# Patient Record
Sex: FEMALE | Race: ASIAN | NOT HISPANIC OR LATINO | Employment: UNEMPLOYED | URBAN - METROPOLITAN AREA
[De-identification: names, ages, dates, MRNs, and addresses within clinical notes are randomized per-mention and may not be internally consistent; named-entity substitution may affect disease eponyms.]

---

## 2022-05-28 LAB
ABO GROUP BLD: NORMAL
HBV SURFACE AG SERPL QL IA: NEGATIVE
HIV 1+2 AB+HIV1 P24 AG SERPL QL IA: NON REACTIVE
RH BLD: POSITIVE
RUBV IGG SERPL IA-ACNC: 2.39
TREPONEMA PALLIDUM IGG+IGM AB [PRESENCE] IN SERUM OR PLASMA BY IMMUNOASSAY: NON REACTIVE

## 2022-08-22 ENCOUNTER — TELEPHONE (OUTPATIENT)
Dept: SCHEDULING | Facility: IMAGING CENTER | Age: 37
End: 2022-08-22

## 2022-08-30 LAB
GLUCOSE 1H P 50 G GLC PO SERPL-MCNC: 217 MG/DL
GLUCOSE 2H P CHAL SERPL-MCNC: 146 MG/DL
GLUCOSE 3H P CHAL SERPL-MCNC: 120 MG/DL
GLUCOSE TOL INTERP 786: 84

## 2022-09-15 ENCOUNTER — OFFICE VISIT (OUTPATIENT)
Dept: MEDICAL GROUP | Facility: LAB | Age: 37
End: 2022-09-15
Payer: COMMERCIAL

## 2022-09-15 VITALS
RESPIRATION RATE: 15 BRPM | TEMPERATURE: 97.5 F | HEART RATE: 93 BPM | OXYGEN SATURATION: 96 % | WEIGHT: 163.5 LBS | DIASTOLIC BLOOD PRESSURE: 58 MMHG | HEIGHT: 65 IN | BODY MASS INDEX: 27.24 KG/M2 | SYSTOLIC BLOOD PRESSURE: 102 MMHG

## 2022-09-15 DIAGNOSIS — Z23 NEED FOR VACCINATION: ICD-10-CM

## 2022-09-15 DIAGNOSIS — Z76.89 ENCOUNTER TO ESTABLISH CARE: ICD-10-CM

## 2022-09-15 DIAGNOSIS — Z34.90 PREGNANCY, UNSPECIFIED GESTATIONAL AGE: ICD-10-CM

## 2022-09-15 PROCEDURE — 99203 OFFICE O/P NEW LOW 30 MIN: CPT | Mod: 25 | Performed by: FAMILY MEDICINE

## 2022-09-15 PROCEDURE — 90471 IMMUNIZATION ADMIN: CPT | Performed by: FAMILY MEDICINE

## 2022-09-15 PROCEDURE — 90715 TDAP VACCINE 7 YRS/> IM: CPT | Performed by: FAMILY MEDICINE

## 2022-09-15 ASSESSMENT — ENCOUNTER SYMPTOMS
NAUSEA: 0
CHILLS: 0
WHEEZING: 0
CONSTIPATION: 0
SHORTNESS OF BREATH: 0
ABDOMINAL PAIN: 0
FEVER: 0
DIARRHEA: 0
VOMITING: 0
PALPITATIONS: 0

## 2022-09-15 NOTE — PROGRESS NOTES
"Lyndsay Pollock is a 37 y.o. female here for   Chief Complaint   Patient presents with    Establish Care     Friend recommended. Family doctor.        HPI:  Lyndsay is a very pleasant 37 y.o. female.     #Establish care   -Reviewed all past medical history, family history, social history.  -Reviewed all screening/vaccinations: Due for Tdap.  Due for hepatitis B.  -Diet and Exercise: Appropriate for age and condition.  -Tobacco, alcohol, recreational drug use: Denies any tobacco, alcohol or recreational illicit drug use.  -Sexually active: Monogamous with single male partner, no concerns.    #Pregnant   -Currently being followed by OB/GYN.  Patient states that she is feeling well.  Denies any abdominal pain, nausea, vomiting, vaginal discharge, blood.  Patient is due in December.  Is extremely excited.  Is currently taking prenatal vitamin as well as aspirin daily.      Current medicines (including changes today)  Current Outpatient Medications   Medication Sig Dispense Refill    ASPIRIN 81 PO Take  by mouth.      Prenatal Vit-Fe Sulfate-FA (PRENATAL MULTIVIT-IRON PO) Take  by mouth.       No current facility-administered medications for this visit.     She  has no past medical history on file.  She  has no past surgical history on file.     Social History     Social History Narrative    Not on file     No family history on file.  No family status information on file.     ROS  Review of Systems   Constitutional:  Negative for chills and fever.   Respiratory:  Negative for shortness of breath and wheezing.    Cardiovascular:  Negative for chest pain and palpitations.   Gastrointestinal:  Negative for abdominal pain, constipation, diarrhea, nausea and vomiting.      Objective:     /58   Pulse 93   Temp 36.4 °C (97.5 °F) (Temporal)   Resp 15   Ht 1.65 m (5' 4.96\")   Wt 74.2 kg (163 lb 8 oz)   SpO2 96%  Body mass index is 27.24 kg/m².  Physical Exam:    Constitutional: Alert, no distress.  Skin: Warm, dry, good " turgor, no rashes in visible areas.  Eye: Equal, round and reactive, conjunctiva clear, lids normal.  Neck: Trachea midline, no masses, no thyromegaly. No cervical or supraclavicular lymphadenopathy.  Respiratory: Unlabored respiratory effort, lungs clear to auscultation bilaterally, no wheezes, rales, or ronchi.  Cardiovascular: Normal S1, S2, RRR, no murmur, no edema.  Abdomen: Soft, non-tender, no masses, no hepatosplenomegaly.  Psych: Alert and oriented x3, normal affect and mood.      Assessment and Plan:   The following treatment plan was discussed    1. Encounter to establish care  -All questions concerns were answered at this time.  -Vaccinations/screenings needed at this time: We will complete Tdap today.  -Labs reviewed, no concerns  -Discussed the importance of a healthy, Mediterranean style diet, routine exercise regimen.  -Discussed general safety measures including seatbelts, helmets, avoidance of smoking, sunscreen, hydration.  -Follow-up for general physical exam on a yearly basis, sooner if needed.    2. Pregnancy, unspecified gestational age  -Stable, no concerns.  Follow-up with OB/GYN on a regular basis.    3. Need for vaccination  -Patient was agreeable to receiving the tdap vaccine in clinic today after discussion of potential risks, benefits, and side effects. Vaccine was administered without adverse effects.  - Tdap Vaccine =>6YO IM      Records requested.  Followup: No follow-ups on file.         This note was created using voice recognition software. I have made every reasonable attempt to correct errors, however, I do anticipate some grammatical errors.

## 2022-11-03 LAB — GP B STREP DNA SPEC QL NAA+PROBE: NEGATIVE

## 2022-11-24 ENCOUNTER — HOSPITAL ENCOUNTER (INPATIENT)
Facility: MEDICAL CENTER | Age: 37
LOS: 3 days | End: 2022-11-27
Attending: OBSTETRICS & GYNECOLOGY | Admitting: OBSTETRICS & GYNECOLOGY
Payer: COMMERCIAL

## 2022-11-24 ENCOUNTER — APPOINTMENT (OUTPATIENT)
Dept: OBGYN | Facility: MEDICAL CENTER | Age: 37
End: 2022-11-24
Attending: OBSTETRICS & GYNECOLOGY
Payer: COMMERCIAL

## 2022-11-24 DIAGNOSIS — G89.18 POSTOPERATIVE PAIN: ICD-10-CM

## 2022-11-24 DIAGNOSIS — Z91.89 BREASTFEEDING PROBLEM: Primary | ICD-10-CM

## 2022-11-24 LAB
BASOPHILS # BLD AUTO: 0.4 % (ref 0–1.8)
BASOPHILS # BLD: 0.03 K/UL (ref 0–0.12)
EOSINOPHIL # BLD AUTO: 0.03 K/UL (ref 0–0.51)
EOSINOPHIL NFR BLD: 0.4 % (ref 0–6.9)
ERYTHROCYTE [DISTWIDTH] IN BLOOD BY AUTOMATED COUNT: 41.3 FL (ref 35.9–50)
HCT VFR BLD AUTO: 32 % (ref 37–47)
HGB BLD-MCNC: 11.2 G/DL (ref 12–16)
HOLDING TUBE BB 8507: NORMAL
IMM GRANULOCYTES # BLD AUTO: 0.05 K/UL (ref 0–0.11)
IMM GRANULOCYTES NFR BLD AUTO: 0.7 % (ref 0–0.9)
LYMPHOCYTES # BLD AUTO: 1.12 K/UL (ref 1–4.8)
LYMPHOCYTES NFR BLD: 15.8 % (ref 22–41)
MCH RBC QN AUTO: 29.9 PG (ref 27–33)
MCHC RBC AUTO-ENTMCNC: 35 G/DL (ref 33.6–35)
MCV RBC AUTO: 85.6 FL (ref 81.4–97.8)
MONOCYTES # BLD AUTO: 0.62 K/UL (ref 0–0.85)
MONOCYTES NFR BLD AUTO: 8.8 % (ref 0–13.4)
NEUTROPHILS # BLD AUTO: 5.23 K/UL (ref 2–7.15)
NEUTROPHILS NFR BLD: 73.9 % (ref 44–72)
NRBC # BLD AUTO: 0 K/UL
NRBC BLD-RTO: 0 /100 WBC
PLATELET # BLD AUTO: 164 K/UL (ref 164–446)
PMV BLD AUTO: 10.3 FL (ref 9–12.9)
RBC # BLD AUTO: 3.74 M/UL (ref 4.2–5.4)
T PALLIDUM AB SER QL IA: NORMAL
WBC # BLD AUTO: 7.1 K/UL (ref 4.8–10.8)

## 2022-11-24 PROCEDURE — 36415 COLL VENOUS BLD VENIPUNCTURE: CPT

## 2022-11-24 PROCEDURE — 770002 HCHG ROOM/CARE - OB PRIVATE (112)

## 2022-11-24 PROCEDURE — 700105 HCHG RX REV CODE 258: Performed by: OBSTETRICS & GYNECOLOGY

## 2022-11-24 PROCEDURE — 700102 HCHG RX REV CODE 250 W/ 637 OVERRIDE(OP): Performed by: OBSTETRICS & GYNECOLOGY

## 2022-11-24 PROCEDURE — A9270 NON-COVERED ITEM OR SERVICE: HCPCS | Performed by: OBSTETRICS & GYNECOLOGY

## 2022-11-24 PROCEDURE — 700111 HCHG RX REV CODE 636 W/ 250 OVERRIDE (IP): Performed by: OBSTETRICS & GYNECOLOGY

## 2022-11-24 PROCEDURE — 85025 COMPLETE CBC W/AUTO DIFF WBC: CPT

## 2022-11-24 PROCEDURE — 3E033VJ INTRODUCTION OF OTHER HORMONE INTO PERIPHERAL VEIN, PERCUTANEOUS APPROACH: ICD-10-PCS | Performed by: OBSTETRICS & GYNECOLOGY

## 2022-11-24 PROCEDURE — 3E0P7VZ INTRODUCTION OF HORMONE INTO FEMALE REPRODUCTIVE, VIA NATURAL OR ARTIFICIAL OPENING: ICD-10-PCS | Performed by: OBSTETRICS & GYNECOLOGY

## 2022-11-24 PROCEDURE — 86780 TREPONEMA PALLIDUM: CPT

## 2022-11-24 RX ORDER — SODIUM CHLORIDE, SODIUM LACTATE, POTASSIUM CHLORIDE, CALCIUM CHLORIDE 600; 310; 30; 20 MG/100ML; MG/100ML; MG/100ML; MG/100ML
INJECTION, SOLUTION INTRAVENOUS CONTINUOUS
Status: DISCONTINUED | OUTPATIENT
Start: 2022-11-24 | End: 2022-11-27 | Stop reason: HOSPADM

## 2022-11-24 RX ORDER — OXYTOCIN 10 [USP'U]/ML
10 INJECTION, SOLUTION INTRAMUSCULAR; INTRAVENOUS
Status: DISCONTINUED | OUTPATIENT
Start: 2022-11-24 | End: 2022-11-25 | Stop reason: HOSPADM

## 2022-11-24 RX ORDER — ACETAMINOPHEN 500 MG
1000 TABLET ORAL
Status: DISCONTINUED | OUTPATIENT
Start: 2022-11-24 | End: 2022-11-25 | Stop reason: HOSPADM

## 2022-11-24 RX ORDER — BUPIVACAINE HYDROCHLORIDE 2.5 MG/ML
INJECTION, SOLUTION EPIDURAL; INFILTRATION; INTRACAUDAL
Status: ACTIVE
Start: 2022-11-24 | End: 2022-11-25

## 2022-11-24 RX ORDER — IBUPROFEN 800 MG/1
800 TABLET ORAL
Status: COMPLETED | OUTPATIENT
Start: 2022-11-24 | End: 2022-11-25

## 2022-11-24 RX ORDER — LIDOCAINE HYDROCHLORIDE 10 MG/ML
20 INJECTION, SOLUTION INFILTRATION; PERINEURAL
Status: DISCONTINUED | OUTPATIENT
Start: 2022-11-24 | End: 2022-11-25 | Stop reason: HOSPADM

## 2022-11-24 RX ORDER — TERBUTALINE SULFATE 1 MG/ML
0.25 INJECTION, SOLUTION SUBCUTANEOUS
Status: DISCONTINUED | OUTPATIENT
Start: 2022-11-24 | End: 2022-11-25 | Stop reason: HOSPADM

## 2022-11-24 RX ORDER — ROPIVACAINE HYDROCHLORIDE 2 MG/ML
INJECTION, SOLUTION EPIDURAL; INFILTRATION; PERINEURAL
Status: COMPLETED
Start: 2022-11-24 | End: 2022-11-25

## 2022-11-24 RX ADMIN — SODIUM CHLORIDE, POTASSIUM CHLORIDE, SODIUM LACTATE AND CALCIUM CHLORIDE: 600; 310; 30; 20 INJECTION, SOLUTION INTRAVENOUS at 15:22

## 2022-11-24 RX ADMIN — OXYTOCIN 2 MILLI-UNITS/MIN: 10 INJECTION, SOLUTION INTRAMUSCULAR; INTRAVENOUS at 15:23

## 2022-11-24 RX ADMIN — MISOPROSTOL 25 MCG: 100 TABLET ORAL at 10:45

## 2022-11-24 RX ADMIN — MISOPROSTOL 25 MCG: 100 TABLET ORAL at 06:25

## 2022-11-24 ASSESSMENT — LIFESTYLE VARIABLES
EVER HAD A DRINK FIRST THING IN THE MORNING TO STEADY YOUR NERVES TO GET RID OF A HANGOVER: NO
HAVE PEOPLE ANNOYED YOU BY CRITICIZING YOUR DRINKING: NO
ALCOHOL_USE: NO
TOTAL SCORE: 0
TOTAL SCORE: 0
HOW MANY TIMES IN THE PAST YEAR HAVE YOU HAD 5 OR MORE DRINKS IN A DAY: 0
TOTAL SCORE: 0
AVERAGE NUMBER OF DAYS PER WEEK YOU HAVE A DRINK CONTAINING ALCOHOL: 0
ON A TYPICAL DAY WHEN YOU DRINK ALCOHOL HOW MANY DRINKS DO YOU HAVE: 0
EVER_SMOKED: NEVER
EVER FELT BAD OR GUILTY ABOUT YOUR DRINKING: NO
CONSUMPTION TOTAL: NEGATIVE
HAVE YOU EVER FELT YOU SHOULD CUT DOWN ON YOUR DRINKING: NO

## 2022-11-24 ASSESSMENT — PATIENT HEALTH QUESTIONNAIRE - PHQ9
1. LITTLE INTEREST OR PLEASURE IN DOING THINGS: NOT AT ALL
SUM OF ALL RESPONSES TO PHQ9 QUESTIONS 1 AND 2: 0
2. FEELING DOWN, DEPRESSED, IRRITABLE, OR HOPELESS: NOT AT ALL
1. LITTLE INTEREST OR PLEASURE IN DOING THINGS: NOT AT ALL
SUM OF ALL RESPONSES TO PHQ9 QUESTIONS 1 AND 2: 0
2. FEELING DOWN, DEPRESSED, IRRITABLE, OR HOPELESS: NOT AT ALL

## 2022-11-24 NOTE — H&P
"History and Physical      Lyndsay Pollock is a 37 y.o. female  at 39w0d who presents for IOL secondary to asymmetrical IUGR with 3 week lag on AC. Denies CTXs, LOF or VB. Good FM    ROS: A comprehensive review of systems was negative.    History reviewed. No pertinent past medical history.  History reviewed. No pertinent surgical history.  History reviewed. No pertinent family history.    Social: denies TOB/ETOH/Drugs    Allergies: Patient has no known allergies.    Current Facility-Administered Medications:     LR infusion, , Intravenous, Continuous, Solange De Luna M.D., Held at 22 0615    lidocaine (XYLOCAINE) 1%  injection, 20 mL, Subcutaneous, Once PRN, Solange De Luna M.D.    terbutaline (BRETHINE) injection 0.25 mg, 0.25 mg, Subcutaneous, Once PRN, Solange De Luna M.D.    oxytocin (PITOCIN) infusion (for post delivery), 500 mL, Intravenous, Once, Held at 22 0615 **FOLLOWED BY** oxytocin (PITOCIN) infusion (for post delivery), 125 mL/hr, Intravenous, Continuous, Solange De Luna M.D., Held at 22 0615    oxytocin (PITOCIN) injection 10 Units, 10 Units, Intramuscular, Once PRN, Solange De Luna M.D.    ibuprofen (MOTRIN) tablet 800 mg, 800 mg, Oral, Once PRN, Solange De Luna M.D.    acetaminophen (TYLENOL) tablet 1,000 mg, 1,000 mg, Oral, Once PRN, Solange De Luna M.D.    miSOPROStol (CYTOTEC) tablet 25 mcg, 25 mcg, Vaginal, Q4HRS PRN, Solange De Luna M.D., 25 mcg at 22 1045    Prenatal care with De Luna starting at 1st trimester with following problems:  Assym IUGR    Objective:      /69   Pulse 69   Temp 36.6 °C (97.9 °F) (Temporal)   Resp 20   Ht 1.64 m (5' 4.57\")   Wt 76.7 kg (169 lb)   SpO2 97%     General:   no acute distress, alert, cooperative   Skin:   normal   HEENT:  EOMI and Neck supple with midline trachea   Lungs:   CTA bilateral   Heart:   regular rate and rhythm, no pedal edema, no JVD, no hepatosplenomegaly   Abdomen:   gravid, NT   EFW:  6 lbs 10 oz   Pelvis:  adequate with gynecoid pelvis   FHT:  Reactive " NST   Uterine Size: S=D   Presentations: Cephalic   Cervix:     Dilation: 1cm    Effacement: 50%    Station:  -2    Consistency: Soft    Position: Posterior     Lab Review  Lab:   Blood type: B     Recent Results (from the past 5880 hour(s))   OP Prenatal Panel-Blood Bank    Collection Time: 05/28/22  9:05 AM   Result Value Ref Range    Rh Grouping Only positive     ABO Grouping Only B    RUBELLA ABS IGG    Collection Time: 05/28/22  9:05 AM   Result Value Ref Range    Rubella IgG Antibody 2.39    HIV AG/AB COMBO ASSAY SCREENING    Collection Time: 05/28/22  9:05 AM   Result Value Ref Range    HIV Ag/Ab Combo Assay Non Reactive    HEP B SURFACE ANTIGEN    Collection Time: 05/28/22  9:05 AM   Result Value Ref Range    Hepatitis B Surface Antigen Negative    T.PALLIDUM AB SUSAN (SCREENING)    Collection Time: 05/28/22  9:05 AM   Result Value Ref Range    Syphilis, Treponemal Qual Non Reactive    GLUCOSE TOLERANCE TEST (EA ADD)    Collection Time: 08/30/22  4:05 PM   Result Value Ref Range    Glucose Tolerance Interp. 84    GLUCOSE 3 HR GESTATIONAL    Collection Time: 08/30/22  4:05 PM   Result Value Ref Range    Glucose 3 Hour 120    GLUCOSE 1HR GESTATIONAL    Collection Time: 08/30/22  4:05 PM   Result Value Ref Range    Glucose, Post Dose 217    GLUCOSE TOLERANCE 2 HOUR    Collection Time: 08/30/22  4:05 PM   Result Value Ref Range    Glucose 2 Hour 146    GRP B STREP, BY PCR (LOCKE BROTH)    Collection Time: 11/03/22 12:00 AM    Specimen: Genital   Result Value Ref Range    Strep Gp B DNA PCR Negative    Hold Blood Bank Specimen (Not Tested)    Collection Time: 11/24/22  5:36 AM   Result Value Ref Range    Holding Tube - Bb DONE    CBC with differential    Collection Time: 11/24/22  5:36 AM   Result Value Ref Range    WBC 7.1 4.8 - 10.8 K/uL    RBC 3.74 (L) 4.20 - 5.40 M/uL    Hemoglobin 11.2 (L) 12.0 - 16.0 g/dL    Hematocrit 32.0 (L) 37.0 - 47.0 %    MCV 85.6 81.4 - 97.8 fL    MCH 29.9 27.0 - 33.0 pg    MCHC 35.0  33.6 - 35.0 g/dL    RDW 41.3 35.9 - 50.0 fL    Platelet Count 164 164 - 446 K/uL    MPV 10.3 9.0 - 12.9 fL    Neutrophils-Polys 73.90 (H) 44.00 - 72.00 %    Lymphocytes 15.80 (L) 22.00 - 41.00 %    Monocytes 8.80 0.00 - 13.40 %    Eosinophils 0.40 0.00 - 6.90 %    Basophils 0.40 0.00 - 1.80 %    Immature Granulocytes 0.70 0.00 - 0.90 %    Nucleated RBC 0.00 /100 WBC    Neutrophils (Absolute) 5.23 2.00 - 7.15 K/uL    Lymphs (Absolute) 1.12 1.00 - 4.80 K/uL    Monos (Absolute) 0.62 0.00 - 0.85 K/uL    Eos (Absolute) 0.03 0.00 - 0.51 K/uL    Baso (Absolute) 0.03 0.00 - 0.12 K/uL    Immature Granulocytes (abs) 0.05 0.00 - 0.11 K/uL    NRBC (Absolute) 0.00 K/uL   T.PALLIDUM AB SUSAN (Syphilis)    Collection Time: 11/24/22  5:36 AM   Result Value Ref Range    Syphilis, Treponemal Qual Non-Reactive Non-Reactive        Assessment:   Lyndsay Pollock at 39w0d  Labor status: Not in labor with IOL for IUGR   Plan:     Admit to L&D  GBS negative  Cytotec followed by pitocin IOL  Epidural when desired

## 2022-11-24 NOTE — PROGRESS NOTES
9980 Report received from Adelaide RN, POC discussed.     0930 RN at bedside, pt eating breakfast. Pt denies any further needs.     1045 Dr. De Luna and RN at bedside, SVE as charted. Cytotec placed per MD.     1430 RN at bedside, pt eating lunch. Pt denies any further needs.     1520 RN at bedside, POC discussed. Pitocin started per Dr. De Luna.     1800 RN at bedside, pt beginning to feel her contractions more. SVE as charted. Pt provided some juice but denies any further questions.     3265 Report given to Cheryl KAYE, POC discussed.

## 2022-11-24 NOTE — PROGRESS NOTES
Pt arrived to unit for scheduled IOL for IUGR. Pt reports +fetal movement at this time. Denies feeling contractions, LOF or vaginal bleeding. SVE as charted. Report called to Dr De Luna. Notified sutures not felt by this RN or Jennifer KAYE. Per Dr De Luna pt vertex on ultrasound in her office and additional US confirmation not needed at this time. Orders for induction received.     0655- Report to Krystina KAYE. Care relinquished.

## 2022-11-25 ENCOUNTER — ANESTHESIA (OUTPATIENT)
Dept: ANESTHESIOLOGY | Facility: MEDICAL CENTER | Age: 37
End: 2022-11-25
Payer: COMMERCIAL

## 2022-11-25 ENCOUNTER — ANESTHESIA EVENT (OUTPATIENT)
Dept: ANESTHESIOLOGY | Facility: MEDICAL CENTER | Age: 37
End: 2022-11-25
Payer: COMMERCIAL

## 2022-11-25 PROCEDURE — A9270 NON-COVERED ITEM OR SERVICE: HCPCS

## 2022-11-25 PROCEDURE — 700101 HCHG RX REV CODE 250: Performed by: STUDENT IN AN ORGANIZED HEALTH CARE EDUCATION/TRAINING PROGRAM

## 2022-11-25 PROCEDURE — 700111 HCHG RX REV CODE 636 W/ 250 OVERRIDE (IP): Performed by: OBSTETRICS & GYNECOLOGY

## 2022-11-25 PROCEDURE — 303615 HCHG EPIDURAL/SPINAL ANESTHESIA FOR LABOR

## 2022-11-25 PROCEDURE — 700111 HCHG RX REV CODE 636 W/ 250 OVERRIDE (IP)

## 2022-11-25 PROCEDURE — 700105 HCHG RX REV CODE 258: Performed by: OBSTETRICS & GYNECOLOGY

## 2022-11-25 PROCEDURE — 0KQM0ZZ REPAIR PERINEUM MUSCLE, OPEN APPROACH: ICD-10-PCS | Performed by: OBSTETRICS & GYNECOLOGY

## 2022-11-25 PROCEDURE — 770002 HCHG ROOM/CARE - OB PRIVATE (112)

## 2022-11-25 PROCEDURE — 01967 NEURAXL LBR ANES VAG DLVR: CPT | Performed by: STUDENT IN AN ORGANIZED HEALTH CARE EDUCATION/TRAINING PROGRAM

## 2022-11-25 PROCEDURE — 88307 TISSUE EXAM BY PATHOLOGIST: CPT

## 2022-11-25 PROCEDURE — 304965 HCHG RECOVERY SERVICES

## 2022-11-25 PROCEDURE — 700111 HCHG RX REV CODE 636 W/ 250 OVERRIDE (IP): Performed by: STUDENT IN AN ORGANIZED HEALTH CARE EDUCATION/TRAINING PROGRAM

## 2022-11-25 PROCEDURE — 59409 OBSTETRICAL CARE: CPT

## 2022-11-25 PROCEDURE — A9270 NON-COVERED ITEM OR SERVICE: HCPCS | Performed by: OBSTETRICS & GYNECOLOGY

## 2022-11-25 PROCEDURE — 700102 HCHG RX REV CODE 250 W/ 637 OVERRIDE(OP): Performed by: OBSTETRICS & GYNECOLOGY

## 2022-11-25 PROCEDURE — 94667 MNPJ CHEST WALL 1ST: CPT

## 2022-11-25 PROCEDURE — 700102 HCHG RX REV CODE 250 W/ 637 OVERRIDE(OP)

## 2022-11-25 RX ORDER — SODIUM CHLORIDE, SODIUM LACTATE, POTASSIUM CHLORIDE, CALCIUM CHLORIDE 600; 310; 30; 20 MG/100ML; MG/100ML; MG/100ML; MG/100ML
INJECTION, SOLUTION INTRAVENOUS PRN
Status: DISCONTINUED | OUTPATIENT
Start: 2022-11-25 | End: 2022-11-27 | Stop reason: HOSPADM

## 2022-11-25 RX ORDER — OXYCODONE HYDROCHLORIDE 5 MG/1
5 TABLET ORAL EVERY 4 HOURS PRN
Status: DISCONTINUED | OUTPATIENT
Start: 2022-11-25 | End: 2022-11-27 | Stop reason: HOSPADM

## 2022-11-25 RX ORDER — ROPIVACAINE HYDROCHLORIDE 2 MG/ML
INJECTION, SOLUTION EPIDURAL; INFILTRATION; PERINEURAL CONTINUOUS
Status: DISCONTINUED | OUTPATIENT
Start: 2022-11-25 | End: 2022-11-25

## 2022-11-25 RX ORDER — LIDOCAINE HYDROCHLORIDE AND EPINEPHRINE 15; 5 MG/ML; UG/ML
INJECTION, SOLUTION EPIDURAL
Status: COMPLETED | OUTPATIENT
Start: 2022-11-25 | End: 2022-11-25

## 2022-11-25 RX ORDER — ROPIVACAINE HYDROCHLORIDE 2 MG/ML
INJECTION, SOLUTION EPIDURAL; INFILTRATION; PERINEURAL CONTINUOUS
Status: DISCONTINUED | OUTPATIENT
Start: 2022-11-25 | End: 2022-11-27 | Stop reason: HOSPADM

## 2022-11-25 RX ORDER — SODIUM CHLORIDE, SODIUM LACTATE, POTASSIUM CHLORIDE, AND CALCIUM CHLORIDE .6; .31; .03; .02 G/100ML; G/100ML; G/100ML; G/100ML
250 INJECTION, SOLUTION INTRAVENOUS PRN
Status: DISCONTINUED | OUTPATIENT
Start: 2022-11-25 | End: 2022-11-25 | Stop reason: HOSPADM

## 2022-11-25 RX ORDER — BISACODYL 10 MG
10 SUPPOSITORY, RECTAL RECTAL PRN
Status: COMPLETED | OUTPATIENT
Start: 2022-11-25 | End: 2022-11-27

## 2022-11-25 RX ORDER — DOCUSATE SODIUM 100 MG/1
100 CAPSULE, LIQUID FILLED ORAL 2 TIMES DAILY PRN
Status: DISCONTINUED | OUTPATIENT
Start: 2022-11-25 | End: 2022-11-27 | Stop reason: HOSPADM

## 2022-11-25 RX ORDER — MISOPROSTOL 200 UG/1
600 TABLET ORAL
Status: COMPLETED | OUTPATIENT
Start: 2022-11-25 | End: 2022-11-25

## 2022-11-25 RX ORDER — IBUPROFEN 800 MG/1
800 TABLET ORAL EVERY 8 HOURS PRN
Status: DISCONTINUED | OUTPATIENT
Start: 2022-11-25 | End: 2022-11-27 | Stop reason: HOSPADM

## 2022-11-25 RX ORDER — ACETAMINOPHEN 500 MG
1000 TABLET ORAL EVERY 6 HOURS PRN
Status: DISCONTINUED | OUTPATIENT
Start: 2022-11-25 | End: 2022-11-27 | Stop reason: HOSPADM

## 2022-11-25 RX ORDER — SODIUM CHLORIDE, SODIUM LACTATE, POTASSIUM CHLORIDE, AND CALCIUM CHLORIDE .6; .31; .03; .02 G/100ML; G/100ML; G/100ML; G/100ML
1000 INJECTION, SOLUTION INTRAVENOUS
Status: DISCONTINUED | OUTPATIENT
Start: 2022-11-25 | End: 2022-11-25 | Stop reason: HOSPADM

## 2022-11-25 RX ORDER — MISOPROSTOL 200 UG/1
TABLET ORAL
Status: COMPLETED
Start: 2022-11-25 | End: 2022-11-25

## 2022-11-25 RX ORDER — METHYLERGONOVINE MALEATE 0.2 MG/ML
0.2 INJECTION INTRAVENOUS
Status: DISCONTINUED | OUTPATIENT
Start: 2022-11-25 | End: 2022-11-27 | Stop reason: HOSPADM

## 2022-11-25 RX ADMIN — IBUPROFEN 800 MG: 800 TABLET, FILM COATED ORAL at 23:08

## 2022-11-25 RX ADMIN — OXYTOCIN 125 ML/HR: 10 INJECTION, SOLUTION INTRAMUSCULAR; INTRAVENOUS at 14:39

## 2022-11-25 RX ADMIN — ACETAMINOPHEN 1000 MG: 500 TABLET ORAL at 17:12

## 2022-11-25 RX ADMIN — MISOPROSTOL 800 MCG: 200 TABLET ORAL at 12:00

## 2022-11-25 RX ADMIN — ACETAMINOPHEN 1000 MG: 500 TABLET ORAL at 23:08

## 2022-11-25 RX ADMIN — ROPIVACAINE HYDROCHLORIDE: 2 INJECTION, SOLUTION EPIDURAL; INFILTRATION at 00:31

## 2022-11-25 RX ADMIN — IBUPROFEN 800 MG: 800 TABLET, FILM COATED ORAL at 14:10

## 2022-11-25 RX ADMIN — LIDOCAINE HYDROCHLORIDE,EPINEPHRINE BITARTRATE 3 ML: 15; .005 INJECTION, SOLUTION EPIDURAL; INFILTRATION; INTRACAUDAL; PERINEURAL at 00:00

## 2022-11-25 RX ADMIN — BUPIVACAINE HYDROCHLORIDE 1 ML: 2.5 INJECTION, SOLUTION EPIDURAL; INFILTRATION; INTRACAUDAL; PERINEURAL at 00:00

## 2022-11-25 RX ADMIN — OXYTOCIN 125 ML/HR: 10 INJECTION, SOLUTION INTRAMUSCULAR; INTRAVENOUS at 12:49

## 2022-11-25 RX ADMIN — ROPIVACAINE HYDROCHLORIDE: 2 INJECTION, SOLUTION EPIDURAL; INFILTRATION; PERINEURAL at 00:31

## 2022-11-25 RX ADMIN — OXYTOCIN 10 UNITS: 10 INJECTION, SOLUTION INTRAMUSCULAR; INTRAVENOUS at 12:05

## 2022-11-25 ASSESSMENT — PAIN DESCRIPTION - PAIN TYPE
TYPE: ACUTE PAIN

## 2022-11-25 NOTE — PROGRESS NOTES
0700: Report received from VARGAS Spears.  0705: Dr. De Jesus at bedside to assess pt; orders received to give bolus button if the pt needs further pain intervention.  0755: With pt permission; this Rn SVE 7/90/0.   0759: Dr. De Luna updated on pt status; MD to come to bedside and assess when she is available.  0945: Dr. De Luna at bedside; MD SVE complete +2. This RN at bedside supporting pt with pushing and assessing FHR.  1140: Dr. De Luna at bedside for delivery.  1157: Delivery of viable male infant; APGARS 8/7; .  1400: Pt up to bathroom with RN assistance; pt voided.  1415: Pt transported up to PPU in stable condition, via wheelchair, with infant in arms and FOB at side.  1445: Report given to VARGAS Bryson.

## 2022-11-25 NOTE — PROGRESS NOTES
3760- this RN came to bedside for epidural placement on pt as assigned RN was providing pt care in another room. Placement with Neal LEÓN was uncomplicated with negative test dose. Pt reported pain control and decreased bp was noted after Neal LEÓN gave partial bolus dose. Due to this, will start epidural continuous at 4ml/hr at this time and call Neal LEÓN to assess prn for pain control  0115- report received from Cheryl KAYE  0217- Neal LEÓN called with update on pt, orders to increase epidural to 6ml/hr and 8ml/hr if needed after that   0700- report given to rogerio KAYE

## 2022-11-25 NOTE — ANESTHESIA PREPROCEDURE EVALUATION
Date: 11/25/22  Procedure: Labor Epidural     38 yo F G1 @39wk. On baby aspirin. Plt 164.    Relevant Problems   No relevant active problems       Physical Exam    Airway   Mallampati: II  TM distance: >3 FB  Neck ROM: full       Cardiovascular - normal exam  Rhythm: regular  Rate: normal  (-) murmur     Dental - normal exam           Pulmonary - normal exam  Breath sounds clear to auscultation     Abdominal     Comments: gravid   Neurological - normal exam                 Anesthesia Plan    ASA 2       Plan - epidural   Neuraxial block will be labor analgesia                  Pertinent diagnostic labs and testing reviewed    Informed Consent:    Anesthetic plan and risks discussed with patient.

## 2022-11-25 NOTE — ANESTHESIA POSTPROCEDURE EVALUATION
Patient: Lyndsay Pollock    Procedure Summary     Date: 11/25/22 Room / Location:     Anesthesia Start: 0010 Anesthesia Stop: 1157    Procedure: Labor Epidural Diagnosis:     Scheduled Providers:  Responsible Provider: Kem Thomas M.D.    Anesthesia Type: epidural ASA Status: 2          Final Anesthesia Type: epidural  Last vitals  BP   Blood Pressure: 112/65    Temp   37.1 °C (98.7 °F)    Pulse   84   Resp   18    SpO2   97 %      Anesthesia Post Evaluation    Patient location during evaluation: PACU  Patient participation: complete - patient participated  Level of consciousness: awake and alert    Airway patency: patent  Anesthetic complications: no  Cardiovascular status: hemodynamically stable  Respiratory status: acceptable  Hydration status: euvolemic    PONV: none          There were no known notable events for this encounter.

## 2022-11-25 NOTE — CARE PLAN
The patient is Stable - Low risk of patient condition declining or worsening    Shift Goals  Clinical Goals: cervical ripening  Patient Goals: delivery of healthy infant  Family Goals: show appropriate support    Progress made toward(s) clinical / shift goals:    Problem: Risk for Infection and Impaired Wound Healing  Goal: Patient will remain free from infection  Outcome: Progressing     Problem: Risk for Injury  Goal: Patient and fetus will be free of preventable injury/complications  Outcome: Progressing     Problem: Pain  Goal: Patient's pain will be alleviated or reduced to the patient’s comfort goal  Outcome: Progressing

## 2022-11-25 NOTE — ANESTHESIA PROCEDURE NOTES
Epidural Block    Date/Time: 11/25/2022 12:00 AM  Performed by: Balaji De Jesus M.D.  Authorized by: Balaji De Jesus M.D.     Patient Location:  OB  Start Time:  11/25/2022 12:00 AM  End Time:  11/25/2022 12:15 AM  Reason for Block: labor analgesia    patient identified, IV checked, site marked, risks and benefits discussed, surgical consent, monitors and equipment checked, pre-op evaluation and timeout performed    Patient Position:  Sitting  Prep: ChloraPrep, patient draped and sterile technique    Monitoring:  Blood pressure, continuous pulse oximetry and heart rate  Approach:  Midline  Location:  L3-L4  Injection Technique:  JULISSA saline  Skin infiltration:  Lidocaine  Strength:  1%  Dose:  3ml  Needle Type:  Tuohy  Needle Gauge:  17 G  Needle Length:  3.5 in  Loss of resistance::  5  Catheter Size:  19 G  Catheter at Skin Depth:  13  Test Dose Result:  Negative   Easy catheter placement. Negative aspiration. Neg test dose.  SBP 110s before the epidural placement. After test dose, SBP in 100s. Pt denies nausea, dizziness. Able to move her legs. Reports 1/10 contraction pain (was 5/10 pain before epidural). Plan to run epidural infusion at 4mL/hr and will increase the rate pt starts having more pain.

## 2022-11-26 LAB
ERYTHROCYTE [DISTWIDTH] IN BLOOD BY AUTOMATED COUNT: 43.5 FL (ref 35.9–50)
HCT VFR BLD AUTO: 28.7 % (ref 37–47)
HGB BLD-MCNC: 9.9 G/DL (ref 12–16)
MCH RBC QN AUTO: 30.6 PG (ref 27–33)
MCHC RBC AUTO-ENTMCNC: 34.5 G/DL (ref 33.6–35)
MCV RBC AUTO: 88.6 FL (ref 81.4–97.8)
PLATELET # BLD AUTO: 143 K/UL (ref 164–446)
PMV BLD AUTO: 10.8 FL (ref 9–12.9)
RBC # BLD AUTO: 3.24 M/UL (ref 4.2–5.4)
WBC # BLD AUTO: 14.4 K/UL (ref 4.8–10.8)

## 2022-11-26 PROCEDURE — 85027 COMPLETE CBC AUTOMATED: CPT

## 2022-11-26 PROCEDURE — A9270 NON-COVERED ITEM OR SERVICE: HCPCS | Performed by: OBSTETRICS & GYNECOLOGY

## 2022-11-26 PROCEDURE — 700102 HCHG RX REV CODE 250 W/ 637 OVERRIDE(OP): Performed by: OBSTETRICS & GYNECOLOGY

## 2022-11-26 PROCEDURE — 770002 HCHG ROOM/CARE - OB PRIVATE (112)

## 2022-11-26 PROCEDURE — 36415 COLL VENOUS BLD VENIPUNCTURE: CPT

## 2022-11-26 RX ADMIN — IBUPROFEN 800 MG: 800 TABLET, FILM COATED ORAL at 11:27

## 2022-11-26 RX ADMIN — ACETAMINOPHEN 1000 MG: 500 TABLET ORAL at 11:27

## 2022-11-26 RX ADMIN — DOCUSATE SODIUM 100 MG: 100 CAPSULE, LIQUID FILLED ORAL at 11:24

## 2022-11-26 ASSESSMENT — PAIN DESCRIPTION - PAIN TYPE: TYPE: ACUTE PAIN

## 2022-11-26 NOTE — PROGRESS NOTES
Received report and took over care of patient at 1045. Patient and  resting comfortably. Questions answered and all needs met. Patient wants to try feeding at 1300.

## 2022-11-26 NOTE — LACTATION NOTE
This note was copied from a baby's chart.  Follow-up visit, attempted latch and baby unable to achieve. Taught mother hand expression, no drops observed at either breast with good technique, she desires to be taught to use Spectra personal breast pump. Set up and reviewed use and settings with Spectra breast pump, educated on milk collection and washing of pump parts. Taught parents measuring of formula, supplemental feeding volume guidelines, paced feeding, and burping. Infant nippled easily and eagerly with bottle. Plan to offer breast first then follow with supplemental formula per feeding volume guidelines, mother may then double pump with her Spectra breast pump to help establish milk production and pablito nipples. Reviewed options for outpatient support and [facility] for [facility] support after discharge. Parents deny questions/concerns.

## 2022-11-26 NOTE — CARE PLAN
The patient is Stable - Low risk of patient condition declining or worsening    Shift Goals  Clinical Goals: void, fundus firm, lochia WDL, pain control  Patient Goals: delivery of healthy baby  Family Goals: support    Progress made toward(s) clinical / shift goals:    Problem: Altered Physiologic Condition  Goal: Patient physiologically stable as evidenced by normal lochia, palpable uterine involution and vitals within normal limits  Outcome: Progressing  Note: Fundus firm and midline. Lochia light, rubra. Vitals within defined limits      Problem: Infection - Postpartum  Goal: Postpartum patient will be free of signs and symptoms of infection  Outcome: Progressing  Note: Patient vitals have been WDL. Perineum, breast free from s/s of infection        Patient is not progressing towards the following goals:

## 2022-11-26 NOTE — PROGRESS NOTES
"Lyndsay Pollock PP day 1    Subjective: Abdominal pain. no, ambulating .minimal, tolerating liquids .yes, tolerating regular diet .yes, flatus.yes, BM .no, Bleeding .light, voiding .yes,dizziness .no, breast feeding.trying but not successful, breast tenderness .no    BP (!) 92/61   Pulse 72   Temp 36.8 °C (98.2 °F) (Temporal)   Resp 17   Ht 1.64 m (5' 4.57\")   Wt 76.7 kg (169 lb)   SpO2 98%   Breast Exam: Tenderness .no, Engourgement .no, Mastitis .no  Abdomen soft, non-tender. BS normal. No masses,  No organomegaly  Incision: none  Fundus Tenderness:no, Below umbilicus:Yes, firm  Perineumperineum intact  ExtremitiesNormal    Meds:   No current facility-administered medications on file prior to encounter.     Current Outpatient Medications on File Prior to Encounter   Medication Sig Dispense Refill    ASPIRIN 81 PO Take  by mouth.      Prenatal Vit-Fe Sulfate-FA (PRENATAL MULTIVIT-IRON PO) Take  by mouth.         Lab:   Recent Results (from the past 48 hour(s))   CBC without differential- Once in AM regardless of delivery time    Collection Time: 11/26/22  5:45 AM   Result Value Ref Range    WBC 14.4 (H) 4.8 - 10.8 K/uL    RBC 3.24 (L) 4.20 - 5.40 M/uL    Hemoglobin 9.9 (L) 12.0 - 16.0 g/dL    Hematocrit 28.7 (L) 37.0 - 47.0 %    MCV 88.6 81.4 - 97.8 fL    MCH 30.6 27.0 - 33.0 pg    MCHC 34.5 33.6 - 35.0 g/dL    RDW 43.5 35.9 - 50.0 fL    Platelet Count 143 (L) 164 - 446 K/uL    MPV 10.8 9.0 - 12.9 fL       Assessment and Plan  normal postpartum course  No heavy bleeding or foul vaginal discharge     Continue Routine postpartum care  Pt desires 48 hr stay for education about normal pains, breast feeding and expectations    "

## 2022-11-26 NOTE — CARE PLAN
The patient is Stable - Low risk of patient condition declining or worsening    Shift Goals  Clinical Goals: pain control, ambulate      Progress made toward(s) clinical / shift goals:  Patient ambulating without difficulty. Pain medication given as requested. Patient fundus firm and palpable, lochia scant to light rubra.    Patient is not progressing towards the following goals:

## 2022-11-26 NOTE — PROGRESS NOTES
2030 Received report from VARGAS Bryson at change of shift. Patient assessed and POC discussed. Patient is resting in bed. Fundus firm, lochia light.  Patient denies any needs at this time. Call light within reach, bed in lowest position. Patient is encouraged to call for pain/med interventions and any other needs.

## 2022-11-26 NOTE — PROGRESS NOTES
Patient transferred from labor and delivery in wheelchair with infant in arms and FOB accompanying with belongings. Two RN verification of infant and parent armbands. Report received from BAILEY Pabon&BRIGIDO KAYE. Patient oriented to unit and POC. Assessment completed, fundus firm and palpable, lochia scant to light rubra. Patient has already voided and is doing so without problems at this time. Pain management discussed. Patient declines intervention for pain at this time. Will call for PRN pain medication. Second bag of pitocin infusing at 125 ml/hr. IV patent, no s/s of infiltration at insertion site. Patient encouraged to call with needs.      Writer called and talked to Neris Wakefield and they informed writer that patient will be discussing wheelchair with Dr Eldridge.    Writer attempted to call patient, and mail box is full and cannot leave a message.  Will attempt later.

## 2022-11-26 NOTE — CARE PLAN
Problem: Psychosocial - L&D  Goal: Patient's level of anxiety will decrease  Outcome: Progressing  Goal: Patient will be able to discuss coping skills during hospitalization  Outcome: Progressing   The patient is Stable - Low risk of patient condition declining or worsening    Shift Goals  Clinical Goals: void, fundus firm, lochia WDL, pain control  Patient Goals: delivery of healthy baby  Family Goals: support    Progress made toward(s) clinical / shift goals:  [facility]    Patient is not progressing towards the following goals:

## 2022-11-26 NOTE — DISCHARGE PLANNING
Care Transition Team Discharge Planning    Anticipated Discharge Disposition: MOB and new born will d/c once both are medically cleared    Action: LSw received order to please provide  and infant resources to the new parents. It indicates they do not have a lot of support locally. LSw attempted to complete Maternal Child Assessment, but noted new born was having a hearing assessment.    Plan: Lsw will return to complete d/c planning assessment.

## 2022-11-26 NOTE — L&D DELIVERY NOTE
DATE OF SERVICE:  2022     The patient is a 37-year-old  at 39 and 1/7 weeks who presented for   induction of labor secondary to asymmetrical IUGR with a 3-week lag on the   abdominal circumference.  The patient upon arrival was noted to be fingertip,   thick, and -2 to -3 station and was started on Cytotec induction of labor x2   and then on Pitocin induction of labor.  The patient had spontaneous rupture   of membrane at some time after 5 cm; however, unsure when that was happening.    The patient went to complete dilation on Pitocin induction of labor and   delivered a viable male infant, Apgars of 8 and then 7 over a second-degree   perineal laceration.  There was no nuchal cord encountered.  Bulb suction was   performed on the perineum.  The remainder of infant delivered without   complication.  Placenta delivered spontaneously.  Three-vessel cord noted to   be intact.  EBL was 200 mL Anesthesia is epidural.  The laceration was   approximated with 3-0 Vicryl.  Sex of the infant is male.  Apgars are 8 and   then 7, weight is 3185 grams.  Currently, mother and infant are doing well.        ______________________________  MD TEX Machuca/LEEANN    DD:  2022 13:23  DT:  2022 20:42    Job#:  [id number]

## 2022-11-26 NOTE — LACTATION NOTE
This note was copied from a baby's chart.  Initial visit, parents desire to breast and formula feed in combination, report they have not yet been successful with either feeding method and baby has been spitting up per parents report. Educated on bottle feeding and encouraged offering breast prior to formula supplementation. Educated that maternal anatomy may make latching challenging and mother desires LC to teach her to use her home breast pump later today as well. Offered to assist with breast and/or bottle feeding at this time, parents report they desire to rest first and eat breakfast, encouraged to call when ready for more teaching by RN and/or LC.

## 2022-11-27 ENCOUNTER — PHARMACY VISIT (OUTPATIENT)
Dept: PHARMACY | Facility: MEDICAL CENTER | Age: 37
End: 2022-11-27
Payer: COMMERCIAL

## 2022-11-27 VITALS
BODY MASS INDEX: 28.16 KG/M2 | RESPIRATION RATE: 18 BRPM | HEART RATE: 71 BPM | TEMPERATURE: 98.5 F | OXYGEN SATURATION: 98 % | WEIGHT: 169 LBS | SYSTOLIC BLOOD PRESSURE: 105 MMHG | HEIGHT: 65 IN | DIASTOLIC BLOOD PRESSURE: 58 MMHG

## 2022-11-27 PROCEDURE — RXMED WILLOW AMBULATORY MEDICATION CHARGE: Performed by: OBSTETRICS & GYNECOLOGY

## 2022-11-27 PROCEDURE — 700102 HCHG RX REV CODE 250 W/ 637 OVERRIDE(OP): Performed by: OBSTETRICS & GYNECOLOGY

## 2022-11-27 PROCEDURE — A9270 NON-COVERED ITEM OR SERVICE: HCPCS | Performed by: OBSTETRICS & GYNECOLOGY

## 2022-11-27 RX ORDER — IBUPROFEN 800 MG/1
800 TABLET ORAL EVERY 8 HOURS PRN
Qty: 30 TABLET | Refills: 1 | Status: SHIPPED | OUTPATIENT
Start: 2022-11-27 | End: 2023-03-30

## 2022-11-27 RX ORDER — PSEUDOEPHEDRINE HCL 30 MG
100 TABLET ORAL 2 TIMES DAILY PRN
Qty: 60 CAPSULE | Refills: 1 | Status: SHIPPED | OUTPATIENT
Start: 2022-11-27 | End: 2023-03-30

## 2022-11-27 RX ADMIN — BISACODYL 10 MG: 10 SUPPOSITORY RECTAL at 09:21

## 2022-11-27 RX ADMIN — IBUPROFEN 800 MG: 800 TABLET, FILM COATED ORAL at 12:23

## 2022-11-27 RX ADMIN — DOCUSATE SODIUM 100 MG: 100 CAPSULE, LIQUID FILLED ORAL at 01:44

## 2022-11-27 ASSESSMENT — PAIN DESCRIPTION - PAIN TYPE
TYPE: ACUTE PAIN
TYPE: ACUTE PAIN

## 2022-11-27 ASSESSMENT — EDINBURGH POSTNATAL DEPRESSION SCALE (EPDS)
THINGS HAVE BEEN GETTING ON TOP OF ME: YES, SOMETIMES I HAVEN'T BEEN COPING AS WELL AS USUAL
I HAVE FELT SCARED OR PANICKY FOR NO GOOD REASON: NO, NOT MUCH
I HAVE BEEN ANXIOUS OR WORRIED FOR NO GOOD REASON: NO, NOT AT ALL
I HAVE BEEN SO UNHAPPY THAT I HAVE BEEN CRYING: ONLY OCCASIONALLY
THE THOUGHT OF HARMING MYSELF HAS OCCURRED TO ME: NEVER
I HAVE BLAMED MYSELF UNNECESSARILY WHEN THINGS WENT WRONG: NOT VERY OFTEN
I HAVE LOOKED FORWARD WITH ENJOYMENT TO THINGS: RATHER LESS THAN I USED TO
I HAVE BEEN ABLE TO LAUGH AND SEE THE FUNNY SIDE OF THINGS: NOT QUITE SO MUCH NOW
I HAVE FELT SAD OR MISERABLE: NOT VERY OFTEN
I HAVE BEEN SO UNHAPPY THAT I HAVE HAD DIFFICULTY SLEEPING: YES, SOMETIMES

## 2022-11-27 NOTE — CARE PLAN
The patient is Stable - Low risk of patient condition declining or worsening    Shift Goals  Clinical Goals: maintian vitals  Patient Goals: Breastfeeding  Family Goals: support    Progress made toward(s) clinical / shift goals:      Problem: Altered Physiologic Condition  Goal: Patient physiologically stable as evidenced by normal lochia, palpable uterine involution and vitals within normal limits  Outcome: Progressing  Note: Fundus firm, lochia light, ambulating.      Problem: Infection - Postpartum  Goal: Postpartum patient will be free of signs and symptoms of infection  Outcome: Progressing  Note: Patient remains free from signs and symptoms of infection, vital signs stable.

## 2022-11-27 NOTE — LACTATION NOTE
This note was copied from a baby's chart.  Mom is 38 y/o P1 who delivered baby boy weighing 7# 0.4 oz at 39.1 wk. Mom reports darker and enlarged areolas during pregnancy. Mom denies any breast surgeries, diabetes, thyroid or fertility issues.   Mom  reports baby has gone to breast a few times and more recently mom breast fed baby on both sides.   Parents wanted LC to assist with latch. LC sat mom more upright in bed and placed baby STS and demonstrated latch techniques. Baby sucks on tongue and latches to tip. LC taught mom how to support baby and bring deeply onto breast for latch. LC demonstrated hand expression and wrote the information for parents to watch the [employer] video.   provided extensive education for parent regarding the three step ln. Supplemental volume feeding guidelines was provided an explained. FOB verbally repeated the 3 step back to Lc. Mom has a pump at home for personal use and she too verbally understands the feeding plan.   Referral will be sent to Breast feeding Medicine.

## 2022-11-27 NOTE — DISCHARGE SUMMARY
Discharge Summary:      Lyndsay Pollock    Admit Date:   2022  Discharge Date:  2022     Admitting diagnosis:  Encounter for induction of labor [Z34.90]  Discharge Diagnosis: Status post vaginal, spontaneous.  Pregnancy Complications: Assym IUGR  Tubal Ligation:  no     History:  History reviewed. No pertinent past medical history.  OB History    Para Term  AB Living   1 1 1     1   SAB IAB Ectopic Molar Multiple Live Births           0 1      # Outcome Date GA Lbr Josh/2nd Weight Sex Delivery Anes PTL Lv   1 Term 22 39w1d / 02:42 3.185 kg (7 lb 0.4 oz) M Vag-Spont EPI N ASHLEY     Patient has no known allergies.  There are no problems to display for this patient.    Hospital Course:   37 y.o. , now para 1, was admitted with the above mentioned diagnosis, underwent Induction of Labor, vaginal, spontaneous. Patient postpartum course was unremarkable, with progressive advancement in diet , ambulation and toleration of oral analgesia. Patient without complaints today and desires discharge.      Vitals:    22 2200 22 0600 22 1756 22 0600   BP: 100/60 (!) 92/61 125/72 105/58   Pulse: 80 72 71 71   Resp: 17 17 18 18   Temp: 36.3 °C (97.4 °F) 36.8 °C (98.2 °F) 36.3 °C (97.4 °F) 36.9 °C (98.5 °F)   TempSrc: Temporal Temporal Temporal Temporal   SpO2: 95% 98% 95% 98%   Weight:       Height:           Current Facility-Administered Medications   Medication Dose    ropivacaine 0.2 % (NAROPIN) injection      lactated ringers infusion      docusate sodium (COLACE) capsule 100 mg  100 mg    ibuprofen (MOTRIN) tablet 800 mg  800 mg    acetaminophen (TYLENOL) tablet 1,000 mg  1,000 mg    measles, mumps and rubella vaccine (MMR) injection 0.5 mL  0.5 mL    methylergonovine (METHERGINE) injection 0.2 mg  0.2 mg    PRN oxytocin (PITOCIN) (20 Units/1000 mL) PRN for excessive uterine bleeding - See Admin Instr  125-999 mL/hr    magnesium hydroxide (MILK OF MAGNESIA) suspension 30 mL   30 mL    oxyCODONE immediate-release (ROXICODONE) tablet 5 mg  5 mg    LR infusion      oxytocin (PITOCIN) infusion (for post delivery)  125 mL/hr    miSOPROStol (CYTOTEC) tablet 25 mcg  25 mcg    oxytocin (Pitocin) 0.02 Units/mL LR (induction of labor)  0.5-20 prerna-units/min       Exam:  Breast Exam: negative  Abdomen: Abdomen soft, non-tender. BS normal. No masses,  No organomegaly  Fundus Non Tender: yes  Incision: none  Perineum: perineum intact  Extremity: extremities, peripheral pulses and reflexes normal     Labs:  Recent Labs     11/26/22  0545   WBC 14.4*   RBC 3.24*   HEMOGLOBIN 9.9*   HEMATOCRIT 28.7*   MCV 88.6   MCH 30.6   MCHC 34.5   RDW 43.5   PLATELETCT 143*   MPV 10.8     Activity:   Discharge to home  Pelvic Rest x 6 weeks    Assessment:  normal postpartum course  Discharge Assessment: No heavy bleeding or foul vaginal discharge      Follow up: 6 weeks     Discharge Meds:   Current Outpatient Medications   Medication Sig Dispense Refill    ibuprofen (MOTRIN) 800 MG Tab Take 1 Tablet by mouth every 8 hours as needed for Moderate Pain. 30 Tablet 1    docusate sodium 100 MG Cap Take 100 mg by mouth 2 times a day as needed for Constipation. 60 Capsule 1       Solange De Luna M.D.

## 2022-11-27 NOTE — DISCHARGE PLANNING
"Discharge Planning Assessment Post Partum    Reason for Referral: Pt and FOB state they do not have a lot of support for the baby. Interested in hearing about community resources for helping with a .  Address: [address] [place] [address] [address] [place] [address]  Type of Living Situation:lives with FOB  Mom Diagnosis: bogdan patient is a 37-year-old  at 39 and 1/7 weeks who presented for   induction of labor secondary to asymmetrical IUGR with a 3-week lag on the   abdominal circumference.   Baby Diagnosis: Birth, viable male infant, Apgars of 8 and then 7  Primary Language: Mandarin. MOB and FOB declined a  and agreed for assessment to be completed in English.     Name of Baby: Abel Anderson  Father of the Baby: Jarod Anderson  Involved in baby’s care? Yes  Contact Information: [phone]    Prenatal Care: Yes, with Solange De Luna, per MOB  Mom's PCP: Curly Garcia  PCP for new baby: [facility]    Support System: MOB reports her friends.   Coping/Bonding between mother & baby: Yes, per MOB  Source of Feeding: Would like to breastfeeding and waiting for her milk and is working with lactation.   Supplies for Infant: Car seat in room, crib-safe sleeping discussed.     Mom's Insurance: [facility].   Baby Covered on Insurance:Will add baby.   Mother Employed/School: No, FOB works full time at [employer].   Other children in the home/names & ages: No.     Financial Hardship/Income: Yes, FOB reports his income covers the rent, but everything is very tight.    Mom's Mental status: Alert and oriented. Mood \"okay\". Affect: Engaged and pleasant. MOB declined SI/HI.   Services used prior to admit: None.     CPS History: No, per MOB  Psychiatric History: No, per MOB  Domestic Violence History: No, per MOB  Drug/ETOH History: No, per MOB    Resources Provided: Yes, post partum supports and resources, list of local resources, food bank referral, diaper bank referral, and list of WIC officers. "   Referrals Made: Diaper bank.      Clearance for Discharge: MOB and baby are cleared by .

## 2022-11-27 NOTE — DISCHARGE INSTRUCTIONS

## 2022-11-27 NOTE — PROGRESS NOTES
Assessment completed. Plan of care reviewed with patient. Patient will call for pain medication intervention if needed.

## 2022-11-27 NOTE — CARE PLAN
The patient is Stable - Low risk of patient condition declining or worsening - discharging     Shift Goals  Clinical Goals: Breast feeding support  Patient Goals: Breast feeding support  Family Goals: support    Progress made toward(s) clinical / shift goals:  Discussed pt's care with lactation. Pt receives lactation education and support from postpartum staff.       Problem: Knowledge Deficit - L&D  Goal: Patient and family/caregivers will demonstrate understanding of plan of care, disease process/condition, diagnostic tests and medications  Outcome: Met     Problem: Risk for Excess Fluid Volume  Goal: Patient will demonstrate pulse, blood pressure and neurologic signs within expected ranges and without any respiratory complications  Outcome: Met     Problem: Psychosocial - L&D  Goal: Patient's level of anxiety will decrease  Outcome: Met  Goal: Patient will be able to discuss coping skills during hospitalization  Outcome: Met  Goal: Patient's ability to re-evaluate and adapt role responsibilities will improve  Outcome: Met  Goal: Spiritual and cultural needs incorporated into hospitalization  Outcome: Met     Problem: Risk for Venous Thromboembolism (VTE)  Goal: VTE prevention measures will be implemented and patient will remain free from VTE  Outcome: Met     Problem: Risk for Infection and Impaired Wound Healing  Goal: Patient will remain free from infection  Outcome: Met  Goal: Patient's wound/surgical incision will decrease in size and heals properly  Outcome: Met     Problem: Risk for Fluid Imbalance  Goal: Patient's fluid volume balance will be maintained or improve  Outcome: Met     Problem: Risk for Injury  Goal: Patient and fetus will be free of preventable injury/complications  Outcome: Met     Problem: Pain  Goal: Patient's pain will be alleviated or reduced to the patient’s comfort goal  Outcome: Met     Problem: Discharge Barriers/Planning  Goal: Patient's continuum of care needs are met  Outcome:  Met     Problem: Pain - Standard  Goal: Alleviation of pain or a reduction in pain to the patient’s comfort goal  Outcome: Met     Problem: Knowledge Deficit - Standard  Goal: Patient and family/care givers will demonstrate understanding of plan of care, disease process/condition, diagnostic tests and medications  Outcome: Met     Problem: Skin Integrity  Goal: Skin integrity is maintained or improved  Outcome: Met     Problem: Knowledge Deficit - Postpartum  Goal: Patient will verbalize and demonstrate understanding of self and infant care  Outcome: Met     Problem: Psychosocial - Postpartum  Goal: Patient will verbalize and demonstrate effective bonding and parenting behavior  Outcome: Met     Problem: Altered Physiologic Condition  Goal: Patient physiologically stable as evidenced by normal lochia, palpable uterine involution and vitals within normal limits  Outcome: Met     Problem: Infection - Postpartum  Goal: Postpartum patient will be free of signs and symptoms of infection  Outcome: Met

## 2022-11-28 LAB — PATHOLOGY CONSULT NOTE: NORMAL

## 2022-11-28 NOTE — PROGRESS NOTES
Mom and baby to DC. Mom denies C/O. Discussed pt's care with OB & with charge. MOB & FOB educated at length concerning supplementing & feeding infant, they verbalize understanding, questions answered. Baby calm, parents deny further needs for infant. Discharge instructions for infant & mother given to mother @ at bedside, mother verbalizes understanding and states plans for follow-up. All belongings accounted for, all questions answered at this time. Car seat checked. Cuddles removed. Infant leaves floor in car seat carried by father.  Mother ambulating @ side w/o difficulty. MOB denies need for WC / Hospital Escort. Walked to elevator. FOB, MOB deny further needs.

## 2022-12-05 ENCOUNTER — APPOINTMENT (OUTPATIENT)
Dept: OBGYN | Facility: CLINIC | Age: 37
End: 2022-12-05
Payer: COMMERCIAL

## 2023-03-27 SDOH — HEALTH STABILITY: PHYSICAL HEALTH: ON AVERAGE, HOW MANY DAYS PER WEEK DO YOU ENGAGE IN MODERATE TO STRENUOUS EXERCISE (LIKE A BRISK WALK)?: 2 DAYS

## 2023-03-27 SDOH — ECONOMIC STABILITY: INCOME INSECURITY: IN THE LAST 12 MONTHS, WAS THERE A TIME WHEN YOU WERE NOT ABLE TO PAY THE MORTGAGE OR RENT ON TIME?: PATIENT REFUSED

## 2023-03-27 SDOH — HEALTH STABILITY: PHYSICAL HEALTH: ON AVERAGE, HOW MANY MINUTES DO YOU ENGAGE IN EXERCISE AT THIS LEVEL?: 60 MIN

## 2023-03-27 SDOH — ECONOMIC STABILITY: INCOME INSECURITY: HOW HARD IS IT FOR YOU TO PAY FOR THE VERY BASICS LIKE FOOD, HOUSING, MEDICAL CARE, AND HEATING?: SOMEWHAT HARD

## 2023-03-27 SDOH — ECONOMIC STABILITY: TRANSPORTATION INSECURITY
IN THE PAST 12 MONTHS, HAS LACK OF RELIABLE TRANSPORTATION KEPT YOU FROM MEDICAL APPOINTMENTS, MEETINGS, WORK OR FROM GETTING THINGS NEEDED FOR DAILY LIVING?: NO

## 2023-03-27 SDOH — ECONOMIC STABILITY: FOOD INSECURITY: WITHIN THE PAST 12 MONTHS, YOU WORRIED THAT YOUR FOOD WOULD RUN OUT BEFORE YOU GOT MONEY TO BUY MORE.: SOMETIMES TRUE

## 2023-03-27 SDOH — ECONOMIC STABILITY: TRANSPORTATION INSECURITY
IN THE PAST 12 MONTHS, HAS LACK OF TRANSPORTATION KEPT YOU FROM MEETINGS, WORK, OR FROM GETTING THINGS NEEDED FOR DAILY LIVING?: NO

## 2023-03-27 SDOH — HEALTH STABILITY: MENTAL HEALTH
STRESS IS WHEN SOMEONE FEELS TENSE, NERVOUS, ANXIOUS, OR CAN'T SLEEP AT NIGHT BECAUSE THEIR MIND IS TROUBLED. HOW STRESSED ARE YOU?: ONLY A LITTLE

## 2023-03-27 SDOH — ECONOMIC STABILITY: HOUSING INSECURITY
IN THE LAST 12 MONTHS, WAS THERE A TIME WHEN YOU DID NOT HAVE A STEADY PLACE TO SLEEP OR SLEPT IN A SHELTER (INCLUDING NOW)?: NO

## 2023-03-27 SDOH — ECONOMIC STABILITY: TRANSPORTATION INSECURITY
IN THE PAST 12 MONTHS, HAS THE LACK OF TRANSPORTATION KEPT YOU FROM MEDICAL APPOINTMENTS OR FROM GETTING MEDICATIONS?: NO

## 2023-03-27 SDOH — ECONOMIC STABILITY: HOUSING INSECURITY
IN THE LAST 12 MONTHS, WAS THERE A TIME WHEN YOU DID NOT HAVE A STEADY PLACE TO SLEEP OR SLEPT IN A SHELTER (INCLUDING NOW)?: PATIENT REFUSED

## 2023-03-27 SDOH — ECONOMIC STABILITY: HOUSING INSECURITY: IN THE LAST 12 MONTHS, HOW MANY PLACES HAVE YOU LIVED?: 1

## 2023-03-27 SDOH — ECONOMIC STABILITY: FOOD INSECURITY: WITHIN THE PAST 12 MONTHS, THE FOOD YOU BOUGHT JUST DIDN'T LAST AND YOU DIDN'T HAVE MONEY TO GET MORE.: PATIENT DECLINED

## 2023-03-27 ASSESSMENT — SOCIAL DETERMINANTS OF HEALTH (SDOH)
WITHIN THE PAST 12 MONTHS, YOU WORRIED THAT YOUR FOOD WOULD RUN OUT BEFORE YOU GOT THE MONEY TO BUY MORE: SOMETIMES TRUE
DO YOU BELONG TO ANY CLUBS OR ORGANIZATIONS SUCH AS CHURCH GROUPS UNIONS, FRATERNAL OR ATHLETIC GROUPS, OR SCHOOL GROUPS?: NO
HOW OFTEN DO YOU ATTENT MEETINGS OF THE CLUB OR ORGANIZATION YOU BELONG TO?: PATIENT DECLINED
HOW OFTEN DO YOU ATTENT MEETINGS OF THE CLUB OR ORGANIZATION YOU BELONG TO?: PATIENT DECLINED
HOW HARD IS IT FOR YOU TO PAY FOR THE VERY BASICS LIKE FOOD, HOUSING, MEDICAL CARE, AND HEATING?: SOMEWHAT HARD
HOW OFTEN DO YOU GET TOGETHER WITH FRIENDS OR RELATIVES?: MORE THAN THREE TIMES A WEEK
HOW OFTEN DO YOU ATTEND CHURCH OR RELIGIOUS SERVICES?: PATIENT DECLINED
HOW OFTEN DO YOU ATTEND CHURCH OR RELIGIOUS SERVICES?: PATIENT DECLINED
HOW OFTEN DO YOU GET TOGETHER WITH FRIENDS OR RELATIVES?: MORE THAN THREE TIMES A WEEK
IN A TYPICAL WEEK, HOW MANY TIMES DO YOU TALK ON THE PHONE WITH FAMILY, FRIENDS, OR NEIGHBORS?: MORE THAN THREE TIMES A WEEK
HOW OFTEN DO YOU HAVE A DRINK CONTAINING ALCOHOL: NEVER
IN A TYPICAL WEEK, HOW MANY TIMES DO YOU TALK ON THE PHONE WITH FAMILY, FRIENDS, OR NEIGHBORS?: MORE THAN THREE TIMES A WEEK
HOW MANY DRINKS CONTAINING ALCOHOL DO YOU HAVE ON A TYPICAL DAY WHEN YOU ARE DRINKING: PATIENT DOES NOT DRINK
DO YOU BELONG TO ANY CLUBS OR ORGANIZATIONS SUCH AS CHURCH GROUPS UNIONS, FRATERNAL OR ATHLETIC GROUPS, OR SCHOOL GROUPS?: NO
HOW OFTEN DO YOU HAVE SIX OR MORE DRINKS ON ONE OCCASION: PATIENT DECLINED

## 2023-03-27 ASSESSMENT — LIFESTYLE VARIABLES
HOW OFTEN DO YOU HAVE A DRINK CONTAINING ALCOHOL: NEVER
AUDIT-C TOTAL SCORE: -1
HOW OFTEN DO YOU HAVE SIX OR MORE DRINKS ON ONE OCCASION: PATIENT DECLINED
HOW MANY STANDARD DRINKS CONTAINING ALCOHOL DO YOU HAVE ON A TYPICAL DAY: PATIENT DOES NOT DRINK
SKIP TO QUESTIONS 9-10: 0

## 2023-03-30 ENCOUNTER — OFFICE VISIT (OUTPATIENT)
Dept: MEDICAL GROUP | Facility: LAB | Age: 38
End: 2023-03-30
Payer: COMMERCIAL

## 2023-03-30 ENCOUNTER — HOSPITAL ENCOUNTER (OUTPATIENT)
Dept: LAB | Facility: MEDICAL CENTER | Age: 38
End: 2023-03-30
Attending: FAMILY MEDICINE
Payer: COMMERCIAL

## 2023-03-30 VITALS
DIASTOLIC BLOOD PRESSURE: 68 MMHG | TEMPERATURE: 97 F | OXYGEN SATURATION: 96 % | WEIGHT: 165 LBS | BODY MASS INDEX: 27.49 KG/M2 | RESPIRATION RATE: 15 BRPM | HEIGHT: 65 IN | SYSTOLIC BLOOD PRESSURE: 100 MMHG | HEART RATE: 74 BPM

## 2023-03-30 DIAGNOSIS — Z13.6 SCREENING FOR CARDIOVASCULAR CONDITION: ICD-10-CM

## 2023-03-30 DIAGNOSIS — Z00.00 ANNUAL PHYSICAL EXAM: ICD-10-CM

## 2023-03-30 LAB
ALBUMIN SERPL BCP-MCNC: 4.5 G/DL (ref 3.2–4.9)
ALBUMIN/GLOB SERPL: 1.3 G/DL
ALP SERPL-CCNC: 57 U/L (ref 30–99)
ALT SERPL-CCNC: 59 U/L (ref 2–50)
ANION GAP SERPL CALC-SCNC: 15 MMOL/L (ref 7–16)
AST SERPL-CCNC: 32 U/L (ref 12–45)
BILIRUB SERPL-MCNC: 0.4 MG/DL (ref 0.1–1.5)
BUN SERPL-MCNC: 14 MG/DL (ref 8–22)
CALCIUM ALBUM COR SERPL-MCNC: 9.3 MG/DL (ref 8.5–10.5)
CALCIUM SERPL-MCNC: 9.7 MG/DL (ref 8.5–10.5)
CHLORIDE SERPL-SCNC: 100 MMOL/L (ref 96–112)
CHOLEST SERPL-MCNC: 189 MG/DL (ref 100–199)
CO2 SERPL-SCNC: 23 MMOL/L (ref 20–33)
CREAT SERPL-MCNC: 0.5 MG/DL (ref 0.5–1.4)
ERYTHROCYTE [DISTWIDTH] IN BLOOD BY AUTOMATED COUNT: 38.1 FL (ref 35.9–50)
FASTING STATUS PATIENT QL REPORTED: NORMAL
GFR SERPLBLD CREATININE-BSD FMLA CKD-EPI: 125 ML/MIN/1.73 M 2
GLOBULIN SER CALC-MCNC: 3.6 G/DL (ref 1.9–3.5)
GLUCOSE SERPL-MCNC: 95 MG/DL (ref 65–99)
HCT VFR BLD AUTO: 43.5 % (ref 37–47)
HDLC SERPL-MCNC: 59 MG/DL
HGB BLD-MCNC: 14.6 G/DL (ref 12–16)
LDLC SERPL CALC-MCNC: 103 MG/DL
MCH RBC QN AUTO: 28.5 PG (ref 27–33)
MCHC RBC AUTO-ENTMCNC: 33.6 G/DL (ref 33.6–35)
MCV RBC AUTO: 85 FL (ref 81.4–97.8)
PLATELET # BLD AUTO: 258 K/UL (ref 164–446)
PMV BLD AUTO: 10.2 FL (ref 9–12.9)
POTASSIUM SERPL-SCNC: 4.3 MMOL/L (ref 3.6–5.5)
PROT SERPL-MCNC: 8.1 G/DL (ref 6–8.2)
RBC # BLD AUTO: 5.12 M/UL (ref 4.2–5.4)
SODIUM SERPL-SCNC: 138 MMOL/L (ref 135–145)
TRIGL SERPL-MCNC: 137 MG/DL (ref 0–149)
WBC # BLD AUTO: 5.2 K/UL (ref 4.8–10.8)

## 2023-03-30 PROCEDURE — 80053 COMPREHEN METABOLIC PANEL: CPT

## 2023-03-30 PROCEDURE — 36415 COLL VENOUS BLD VENIPUNCTURE: CPT

## 2023-03-30 PROCEDURE — 80061 LIPID PANEL: CPT

## 2023-03-30 PROCEDURE — 99395 PREV VISIT EST AGE 18-39: CPT | Performed by: FAMILY MEDICINE

## 2023-03-30 PROCEDURE — 85027 COMPLETE CBC AUTOMATED: CPT

## 2023-03-30 ASSESSMENT — ENCOUNTER SYMPTOMS
BLURRED VISION: 0
DEPRESSION: 0
DIARRHEA: 0
VOMITING: 0
ABDOMINAL PAIN: 0
CONSTIPATION: 0
FEVER: 0
NERVOUS/ANXIOUS: 0
SHORTNESS OF BREATH: 0
NAUSEA: 0
CHILLS: 0
WHEEZING: 0
PALPITATIONS: 0

## 2023-03-30 NOTE — PROGRESS NOTES
"Subjective:   Lyndsay Pollock is a 37 y.o. female here today for   Chief Complaint   Patient presents with    Annual Exam     #Annual   -Reviewed all past medical history, family history, social history.  -Reviewed all screening/vaccinations: Due for labs.  -Diet and Exercise: Working on improving diet and exercise.  Patient is postpartum, child delivered in November.  -Tobacco, alcohol, recreational drug use: Rare alcohol use.  Denies any tobacco, drug use.  -Established dental home.   -Sexually active: Monogamous with single male partner, no concerns      No Known Allergies      Current medicines (including changes today)  No current outpatient medications on file.     No current facility-administered medications for this visit.     She  has no past medical history on file.    ROS   Review of Systems   Constitutional:  Negative for chills and fever.   HENT:  Negative for hearing loss.    Eyes:  Negative for blurred vision.   Respiratory:  Negative for shortness of breath and wheezing.    Cardiovascular:  Negative for chest pain and palpitations.   Gastrointestinal:  Negative for abdominal pain, constipation, diarrhea, nausea and vomiting.   Psychiatric/Behavioral:  Negative for depression. The patient is not nervous/anxious.         Objective:     Physical Exam:  /68   Pulse 74   Temp 36.1 °C (97 °F) (Temporal)   Resp 15   Ht 1.64 m (5' 4.57\")   Wt 74.8 kg (165 lb)   SpO2 96%  Body mass index is 27.83 kg/m².   Constitutional: Alert, no distress.  Skin: Warm, dry, good turgor, no rashes in visible areas.  Eye: Equal, round and reactive, conjunctiva clear, lids normal.  ENMT: TM's clear bilaterally, lips without lesions, good dentition, oropharynx clear.  Neck: Trachea midline, no masses, no thyromegaly. No cervical or supraclavicular lymphadenopathy.  Respiratory: Unlabored respiratory effort, lungs clear to auscultation, no wheezes, no rhonchi.  Cardiovascular: Normal S1, S2, no murmur, no edema.  Abdomen: " Soft, non-tender, no masses, no hepatosplenomegaly.  Psych: Alert and oriented x3, normal affect and mood.    Assessment and Plan:     1. Annual physical exam  -All questions concerns were answered at this time.  -Vaccinations/screenings needed at this time: Up-to-date on screenings.  Follow-up with OB/GYN for routine cervical cancer screening.  -Labs reviewed, no concerns, recheck labs as below.  -Discussed the importance of a healthy, Mediterranean style diet, routine exercise regimen.  -Discussed general safety measures including seatbelts, helmets, avoidance of smoking, sunscreen, hydration.  -Follow-up for general physical exam on a yearly basis, sooner if needed.  - CBC WITHOUT DIFFERENTIAL; Future  - Comp Metabolic Panel; Future    2. Screening for cardiovascular condition  - Lipid Profile; Future    Followup: No follow-ups on file.         PLEASE NOTE: This dictation was created using voice recognition software. I have made every reasonable attempt to correct obvious errors, but I expect that there are errors of grammar and possibly content that I did not discover before finalizing the note.